# Patient Record
Sex: MALE | Race: WHITE | NOT HISPANIC OR LATINO | URBAN - METROPOLITAN AREA
[De-identification: names, ages, dates, MRNs, and addresses within clinical notes are randomized per-mention and may not be internally consistent; named-entity substitution may affect disease eponyms.]

---

## 2017-02-13 ENCOUNTER — OUTPATIENT (OUTPATIENT)
Dept: OUTPATIENT SERVICES | Facility: HOSPITAL | Age: 32
LOS: 1 days | End: 2017-02-13
Payer: COMMERCIAL

## 2017-02-13 VITALS
HEART RATE: 80 BPM | DIASTOLIC BLOOD PRESSURE: 80 MMHG | SYSTOLIC BLOOD PRESSURE: 129 MMHG | RESPIRATION RATE: 14 BRPM | TEMPERATURE: 99 F | WEIGHT: 194.01 LBS

## 2017-02-13 DIAGNOSIS — Z98.890 OTHER SPECIFIED POSTPROCEDURAL STATES: Chronic | ICD-10-CM

## 2017-02-13 DIAGNOSIS — K40.30 UNILATERAL INGUINAL HERNIA, WITH OBSTRUCTION, WITHOUT GANGRENE, NOT SPECIFIED AS RECURRENT: ICD-10-CM

## 2017-02-13 DIAGNOSIS — Z01.818 ENCOUNTER FOR OTHER PREPROCEDURAL EXAMINATION: ICD-10-CM

## 2017-02-13 LAB
HCT VFR BLD CALC: 48.9 % — SIGNIFICANT CHANGE UP (ref 39–50)
HGB BLD-MCNC: 16.2 G/DL — SIGNIFICANT CHANGE UP (ref 13–17)
MCHC RBC-ENTMCNC: 30.2 PG — SIGNIFICANT CHANGE UP (ref 27–34)
MCHC RBC-ENTMCNC: 33.1 GM/DL — SIGNIFICANT CHANGE UP (ref 32–36)
MCV RBC AUTO: 91.2 FL — SIGNIFICANT CHANGE UP (ref 80–100)
PLATELET # BLD AUTO: 235 K/UL — SIGNIFICANT CHANGE UP (ref 150–400)
RBC # BLD: 5.36 M/UL — SIGNIFICANT CHANGE UP (ref 4.2–5.8)
RBC # FLD: 11.9 % — SIGNIFICANT CHANGE UP (ref 10.3–14.5)
WBC # BLD: 9.1 K/UL — SIGNIFICANT CHANGE UP (ref 3.8–10.5)
WBC # FLD AUTO: 9.1 K/UL — SIGNIFICANT CHANGE UP (ref 3.8–10.5)

## 2017-02-13 PROCEDURE — 85027 COMPLETE CBC AUTOMATED: CPT

## 2017-02-13 PROCEDURE — G0463: CPT

## 2017-02-13 NOTE — H&P PST ADULT - RS GEN PE MLT RESP DETAILS PC
breath sounds equal/normal/good air movement/airway patent/clear to auscultation bilaterally/respirations non-labored

## 2017-02-13 NOTE — H&P PST ADULT - FAMILY HISTORY
Mother  Still living? Yes, Estimated age: Age Unknown  CAD (coronary artery disease), Age at diagnosis: Age Unknown  Family history of AICD (automatic internal cardiac defibrillator), Age at diagnosis: Age Unknown

## 2017-02-13 NOTE — H&P PST ADULT - NSANTHOSAYNRD_GEN_A_CORE
No. CARMEN screening performed.  STOP BANG Legend: 0-2 = LOW Risk; 3-4 = INTERMEDIATE Risk; 5-8 = HIGH Risk

## 2017-02-13 NOTE — H&P PST ADULT - NEGATIVE CARDIOVASCULAR SYMPTOMS
no palpitations/no peripheral edema/no paroxysmal nocturnal dyspnea/no orthopnea/no chest pain/no claudication/no dyspnea on exertion

## 2017-02-13 NOTE — H&P PST ADULT - HISTORY OF PRESENT ILLNESS
32yo 32yo male with no PMH here for PST. Pt first was diagnosed with left inguinal hernia in 4/2016. Pt reports to left inguinal hernia getting larger in size after recently getting sick and coughing. Pt denies n/v/d. Pt denies abdominal pain. Pt electing for repair left inguinal hernia with mesh on 2/17/17.

## 2017-02-13 NOTE — H&P PST ADULT - PMH
<<----- Click to add NO pertinent Past Medical History Unilateral inguinal hernia with obstruction and without gangrene

## 2017-02-13 NOTE — H&P PST ADULT - GASTROINTESTINAL DETAILS
bowel sounds normal/no distention/nontender/soft/no guarding/normal/no organomegaly/no bruit/no rigidity/no masses palpable/no rebound tenderness

## 2017-02-16 RX ORDER — SODIUM CHLORIDE 9 MG/ML
1000 INJECTION, SOLUTION INTRAVENOUS
Qty: 0 | Refills: 0 | Status: DISCONTINUED | OUTPATIENT
Start: 2017-02-17 | End: 2017-02-17

## 2017-02-17 ENCOUNTER — OUTPATIENT (OUTPATIENT)
Dept: OUTPATIENT SERVICES | Facility: HOSPITAL | Age: 32
LOS: 1 days | Discharge: HOME | End: 2017-02-17
Payer: COMMERCIAL

## 2017-02-17 VITALS
SYSTOLIC BLOOD PRESSURE: 132 MMHG | DIASTOLIC BLOOD PRESSURE: 69 MMHG | TEMPERATURE: 98 F | RESPIRATION RATE: 14 BRPM | HEART RATE: 75 BPM | WEIGHT: 194.01 LBS | OXYGEN SATURATION: 96 %

## 2017-02-17 VITALS
OXYGEN SATURATION: 96 % | SYSTOLIC BLOOD PRESSURE: 110 MMHG | RESPIRATION RATE: 15 BRPM | DIASTOLIC BLOOD PRESSURE: 60 MMHG | HEART RATE: 76 BPM | TEMPERATURE: 98 F

## 2017-02-17 DIAGNOSIS — Z98.890 OTHER SPECIFIED POSTPROCEDURAL STATES: Chronic | ICD-10-CM

## 2017-02-17 DIAGNOSIS — K40.30 UNILATERAL INGUINAL HERNIA, WITH OBSTRUCTION, WITHOUT GANGRENE, NOT SPECIFIED AS RECURRENT: ICD-10-CM

## 2017-02-17 PROCEDURE — C1781: CPT

## 2017-02-17 PROCEDURE — 49507 PRP I/HERN INIT BLOCK >5 YR: CPT | Mod: LT

## 2017-02-17 RX ORDER — CEFAZOLIN SODIUM 1 G
1000 VIAL (EA) INJECTION ONCE
Qty: 0 | Refills: 0 | Status: COMPLETED | OUTPATIENT
Start: 2017-02-17 | End: 2017-02-17

## 2017-02-17 RX ORDER — OXYCODONE HYDROCHLORIDE 5 MG/1
10 TABLET ORAL EVERY 6 HOURS
Qty: 0 | Refills: 0 | Status: DISCONTINUED | OUTPATIENT
Start: 2017-02-17 | End: 2017-02-18

## 2017-02-17 RX ORDER — HYDROMORPHONE HYDROCHLORIDE 2 MG/ML
0.5 INJECTION INTRAMUSCULAR; INTRAVENOUS; SUBCUTANEOUS
Qty: 0 | Refills: 0 | Status: DISCONTINUED | OUTPATIENT
Start: 2017-02-17 | End: 2017-02-18

## 2017-02-17 RX ORDER — SODIUM CHLORIDE 9 MG/ML
1000 INJECTION, SOLUTION INTRAVENOUS
Qty: 0 | Refills: 0 | Status: DISCONTINUED | OUTPATIENT
Start: 2017-02-17 | End: 2017-02-18

## 2017-02-17 RX ORDER — OXYCODONE HYDROCHLORIDE 5 MG/1
5 TABLET ORAL EVERY 4 HOURS
Qty: 0 | Refills: 0 | Status: DISCONTINUED | OUTPATIENT
Start: 2017-02-17 | End: 2017-02-18

## 2017-02-17 RX ADMIN — SODIUM CHLORIDE 60 MILLILITER(S): 9 INJECTION, SOLUTION INTRAVENOUS at 12:55

## 2017-02-17 RX ADMIN — SODIUM CHLORIDE 100 MILLILITER(S): 9 INJECTION, SOLUTION INTRAVENOUS at 16:12

## 2017-02-17 NOTE — ASU DISCHARGE PLAN (ADULT/PEDIATRIC). - MEDICATION SUMMARY - MEDICATIONS TO TAKE
I will START or STAY ON the medications listed below when I get home from the hospital:    Norco 325 mg-5 mg oral tablet  -- 1 tab(s) by mouth every 6 hours, As Needed MDD:4  -- Caution federal law prohibits the transfer of this drug to any person other  than the person for whom it was prescribed.  May cause drowsiness.  Alcohol may intensify this effect.  Use care when operating dangerous machinery.  This product contains acetaminophen.  Do not use  with any other product containing acetaminophen to prevent possible liver damage.  Using more of this medication than prescribed may cause serious breathing problems.    -- Indication: For pain med

## 2017-02-17 NOTE — ASU DISCHARGE PLAN (ADULT/PEDIATRIC). - NOTIFY
Fever greater than 101/Persistent Nausea and Vomiting/Inability to Tolerate Liquids or Foods/Bleeding that does not stop/GYN Fever>100.4

## 2017-02-23 DIAGNOSIS — K40.30 UNILATERAL INGUINAL HERNIA, WITH OBSTRUCTION, WITHOUT GANGRENE, NOT SPECIFIED AS RECURRENT: ICD-10-CM

## 2019-08-15 NOTE — BRIEF OPERATIVE NOTE - PRE-OP DX
(H25.12) Age-related nuclear cataract, left eye - Assesment : Examination revealed cataract. - Plan : Monitor for changes. Advised patient to call our office with decreased vision or increased symptoms.
(H53.2) Diplopia - Assesment : Examination revealed diplopia. Muscle imbalance. Hx of trauma OS per patient. Currently has glasses with prisms. Significant astigmatism OD. Discussed with patient minimal improvement with vision OD with changes in glasses or CL. We can rx glasses with prism to be able to coordinate vision better. Advised that when we proceed with cataract surgery OS we can reduce the difference in power between the eyes. Patient should try new rx with increased prism prior to considering to proceed with cataract surgery. Advised that having cataract surgery will not get rid of diplopia but would help  reduce anisometropia. Patient can also consider CL trial OS to help reduce anisometropia. - Plan : New rx with increased prism given today. Monitor for changes. Rv 2-3 months follow up.
Inguinal hernia  02/17/2017  Left Inguinal Hernia  Active  Selvin Jones  Inguinal hernia  02/17/2017  Incarcerarted Left Inguinal Hernia  Active  Selvin Jones

## 2025-07-16 NOTE — ASU PATIENT PROFILE, ADULT - CLICK TO LAUNCH ORM
PreOp and Medication Instructions given and reviewed:   - Verbal medication instructions (instructed to hold vitamins, herbal supplements and NSAIDS one week prior to surgery)  - NPO guidelines, unless otherwise instructed by Surgeon's Office  - Arrival place and time to be given by the Surgeon's Office   - Shower with antibacterial soap   - No makeup or moisturizer to face   - No perfume/cologne, powder, lotions, deodorant or aftershave   - Leave all jewelry, including body piercings, and valuables at home.  - Arrange for someone to drive you home following surgery; will not be allowed to leave the surgical facility alone or drive self home following sedation and anesthesia.    Pt verbalized understanding.  Pt instructed to call POC with any questions or changes.        .